# Patient Record
Sex: MALE | Race: WHITE | NOT HISPANIC OR LATINO | Employment: FULL TIME
[De-identification: names, ages, dates, MRNs, and addresses within clinical notes are randomized per-mention and may not be internally consistent; named-entity substitution may affect disease eponyms.]

---

## 2019-10-03 ENCOUNTER — HEALTH MAINTENANCE LETTER (OUTPATIENT)
Age: 39
End: 2019-10-03

## 2020-11-07 ENCOUNTER — HEALTH MAINTENANCE LETTER (OUTPATIENT)
Age: 40
End: 2020-11-07

## 2021-09-05 ENCOUNTER — HEALTH MAINTENANCE LETTER (OUTPATIENT)
Age: 41
End: 2021-09-05

## 2021-12-26 ENCOUNTER — HEALTH MAINTENANCE LETTER (OUTPATIENT)
Age: 41
End: 2021-12-26

## 2022-10-29 ENCOUNTER — HEALTH MAINTENANCE LETTER (OUTPATIENT)
Age: 42
End: 2022-10-29

## 2023-09-02 ENCOUNTER — HEALTH MAINTENANCE LETTER (OUTPATIENT)
Age: 43
End: 2023-09-02

## 2024-09-28 ENCOUNTER — OFFICE VISIT (OUTPATIENT)
Dept: ENDOCRINOLOGY | Facility: CLINIC | Age: 44
End: 2024-09-28
Payer: COMMERCIAL

## 2024-09-28 VITALS
HEART RATE: 60 BPM | WEIGHT: 134.4 LBS | OXYGEN SATURATION: 98 % | DIASTOLIC BLOOD PRESSURE: 83 MMHG | BODY MASS INDEX: 22.39 KG/M2 | SYSTOLIC BLOOD PRESSURE: 122 MMHG | RESPIRATION RATE: 16 BRPM | HEIGHT: 65 IN

## 2024-09-28 DIAGNOSIS — K91.2 POSTOPERATIVE INTESTINAL MALABSORPTION: Primary | ICD-10-CM

## 2024-09-28 PROCEDURE — 99204 OFFICE O/P NEW MOD 45 MIN: CPT | Performed by: SURGERY

## 2024-09-28 RX ORDER — IBUPROFEN 200 MG
1 CAPSULE ORAL 2 TIMES DAILY
COMMUNITY

## 2024-09-28 RX ORDER — MULTIVITAMIN WITH IRON
1 TABLET ORAL DAILY
COMMUNITY

## 2024-09-28 ASSESSMENT — PAIN SCALES - GENERAL: PAINLEVEL: NO PAIN (0)

## 2024-09-28 NOTE — PROGRESS NOTES
"This is a 43 year old gentleman who presents for routine follow up following emergent exploratory laparotomy with resection of majority of his small bowel  (9/1/2005)  with delayed reconstruction of his GI tract leaving approximately 50 cm of small intestine including 10 cm of ileum(10/7/2005).  This was following a Jaylin-en Y gastric bypass performed at an OSH 10/2024.  Patient subsequently underwent a laparoscopic cholecystectomy(4/19/2006).  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  Reports excellent control of bowel movements and stable weight for some time. Reports osteopenia and osteoporosis. Currently lives in the [facility] area.    Past Medical History:   Diagnosis Date    Depressive disorder     Gilbert disease     Diagnosed in 2007. TBili 4.2 at time of diagnosis.    PONV (postoperative nausea and vomiting)     Short bowel syndrome     Had incarcerated hernia 12 months after Jaylin-En-Y requiring bowel resection, now with short gut syndrome. Take ciprofloxacin daily for sx of bacterial overgrowth.     Past Surgical History:   Procedure Laterality Date    APPENDECTOMY  1997    BIOPSY  2012    CHOLECYSTECTOMY  2006    COLONOSCOPY  2006    GASTRIC BYPASS  2005    RnY, obesity    SMALL BOWEL RESECTION  9-27-05, 10-30-05    volvulus/infarcted Jaylin limb and common channel, reanastomosis    SPHINCTEROTOMY RECTUM  6/20/2013    Procedure: SPHINCTEROTOMY RECTUM;  Lateral Internal Sphincterotomy;  Surgeon: Sam Holder MD;  Location: [facility] OR     Current Outpatient Medications   Medication Instructions    calcium citrate (CITRACAL) 950 (200 Ca) MG tablet 1 tablet, Oral, 2 TIMES DAILY    Copper 5 MG TABS Oral    cyanocobalamin (CYANOCOBALAMIN) 1,000 mcg, Intramuscular, EVERY 30 DAYS    insulin syringe-needle U-100 (BD INSULIN SYRINGE ULTRAFINE) 30G X 1/2\" 1 ML Use one syringe monthly to inject B12.  Ok to substitute size for B-12.    magnesium 250 MG tablet 1 tablet, Oral, DAILY       Allergies   Allergen " "Reactions    Sulfa Antibiotics Hives      /83   Pulse 60   Resp 16   Ht 1.651 m (5' 5\")   Wt 61 kg (134 lb 6.4 oz)   SpO2 98%   BMI 22.37 kg/m      On physical examination : patient has slight icterus.  Abdomen is soft and NT with an approximate 3 cm mid abdominal hernia    Labs reviewed  With normal albumin 4.7 g/dL and T.B. 4.4 mg/dL    A/P:  Patient continues to do remarkably well. Recommend aggressive management of bone loss and also consideration locally for hernia repair.  "

## 2024-09-28 NOTE — LETTER
9/28/2024       RE: Mairano Mcnamara  [address] [place] [address]  [place] [address]     Dear Colleague,    Thank you for referring your patient, Mariano Mcnamara, to the [facility] [place] [facility]. Please see a copy of my visit note below.    This is a 43 year old gentleman who presents for routine follow up following emergent exploratory laparotomy with resection of majority of his small bowel  (9/1/2005)  with delayed reconstruction of his GI tract leaving approximately 50 cm of small intestine including 10 cm of ileum(10/7/2005).  This was following a Jaylin-en Y gastric bypass performed at an OSH 10/2024.  Patient subsequently underwent a laparoscopic cholecystectomy(4/19/2006).  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  Reports excellent control of bowel movements and stable weight for some time. Reports osteopenia and osteoporosis. Currently lives in the [facility] area.    Past Medical History:   Diagnosis Date     Depressive disorder      Gilbert disease     Diagnosed in 2007. TBili 4.2 at time of diagnosis.     PONV (postoperative nausea and vomiting)      Short bowel syndrome     Had incarcerated hernia 12 months after Jaylin-En-Y requiring bowel resection, now with short gut syndrome. Take ciprofloxacin daily for sx of bacterial overgrowth.     Past Surgical History:   Procedure Laterality Date     APPENDECTOMY  1997     BIOPSY  2012     CHOLECYSTECTOMY  2006     COLONOSCOPY  2006     GASTRIC BYPASS  2005    RnY, obesity     SMALL BOWEL RESECTION  9-27-05, 10-30-05    volvulus/infarcted Jaylin limb and common channel, reanastomosis     SPHINCTEROTOMY RECTUM  6/20/2013    Procedure: SPHINCTEROTOMY RECTUM;  Lateral Internal Sphincterotomy;  Surgeon: Sam Holder MD;  Location: [facility] OR     Current Outpatient Medications   Medication Instructions     calcium citrate (CITRACAL) 950 (200 Ca) MG tablet 1 tablet, Oral, 2 TIMES  "DAILY     Copper 5 MG TABS Oral     cyanocobalamin (CYANOCOBALAMIN) 1,000 mcg, Intramuscular, EVERY 30 DAYS     insulin syringe-needle U-100 (BD INSULIN SYRINGE ULTRAFINE) 30G X 1/2\" 1 ML Use one syringe monthly to inject B12.  Ok to substitute size for B-12.     magnesium 250 MG tablet 1 tablet, Oral, DAILY       Allergies   Allergen Reactions     Sulfa Antibiotics Hives      /83   Pulse 60   Resp 16   Ht 1.651 m (5' 5\")   Wt 61 kg (134 lb 6.4 oz)   SpO2 98%   BMI 22.37 kg/m      On physical examination : patient has slight icterus.  Abdomen is soft and NT with an approximate 3 cm mid abdominal hernia    Labs reviewed  With normal albumin 4.7 g/dL and T.B. 4.4 mg/dL    A/P:  Patient continues to do remarkably well. Recommend aggressive management of bone loss and also consideration locally for hernia repair.      Again, thank you for allowing me to participate in the care of your patient.      Sincerely,    Christianmarya Hawley MD    "